# Patient Record
Sex: MALE | Race: WHITE | NOT HISPANIC OR LATINO | Employment: OTHER | URBAN - METROPOLITAN AREA
[De-identification: names, ages, dates, MRNs, and addresses within clinical notes are randomized per-mention and may not be internally consistent; named-entity substitution may affect disease eponyms.]

---

## 2023-03-28 ENCOUNTER — HOSPITAL ENCOUNTER (EMERGENCY)
Facility: HOSPITAL | Age: 73
Discharge: HOME | End: 2023-03-28
Payer: COMMERCIAL

## 2023-03-28 VITALS
OXYGEN SATURATION: 95 % | WEIGHT: 140 LBS | TEMPERATURE: 98 F | SYSTOLIC BLOOD PRESSURE: 170 MMHG | HEIGHT: 65 IN | HEART RATE: 75 BPM | BODY MASS INDEX: 23.32 KG/M2 | DIASTOLIC BLOOD PRESSURE: 92 MMHG | RESPIRATION RATE: 20 BRPM

## 2023-03-28 DIAGNOSIS — M70.22 OLECRANON BURSITIS OF LEFT ELBOW: Primary | ICD-10-CM

## 2023-03-28 DIAGNOSIS — M25.522 PAIN AND SWELLING OF ELBOW, LEFT: ICD-10-CM

## 2023-03-28 DIAGNOSIS — M25.422 PAIN AND SWELLING OF ELBOW, LEFT: ICD-10-CM

## 2023-03-28 LAB
HIV 1+2 AB+HIV1 P24 AG SERPL QL IA: NORMAL
POCT GLUCOSE: 82 MG/DL (ref 70–110)

## 2023-03-28 PROCEDURE — 73080 X-RAY EXAM OF ELBOW: CPT | Mod: 26,LT,, | Performed by: RADIOLOGY

## 2023-03-28 PROCEDURE — 73080 X-RAY EXAM OF ELBOW: CPT | Mod: TC,LT

## 2023-03-28 PROCEDURE — 73080 XR ELBOW COMPLETE 3 VIEW LEFT: ICD-10-PCS | Mod: 26,LT,, | Performed by: RADIOLOGY

## 2023-03-28 PROCEDURE — 82962 GLUCOSE BLOOD TEST: CPT

## 2023-03-28 PROCEDURE — 96372 THER/PROPH/DIAG INJ SC/IM: CPT | Performed by: NURSE PRACTITIONER

## 2023-03-28 PROCEDURE — 99284 EMERGENCY DEPT VISIT MOD MDM: CPT

## 2023-03-28 PROCEDURE — 86803 HEPATITIS C AB TEST: CPT | Performed by: EMERGENCY MEDICINE

## 2023-03-28 PROCEDURE — 63600175 PHARM REV CODE 636 W HCPCS: Performed by: NURSE PRACTITIONER

## 2023-03-28 PROCEDURE — 87389 HIV-1 AG W/HIV-1&-2 AB AG IA: CPT | Performed by: EMERGENCY MEDICINE

## 2023-03-28 RX ORDER — DEXAMETHASONE SODIUM PHOSPHATE 10 MG/ML
6 INJECTION INTRAMUSCULAR; INTRAVENOUS
Status: COMPLETED | OUTPATIENT
Start: 2023-03-28 | End: 2023-03-28

## 2023-03-28 RX ADMIN — DEXAMETHASONE SODIUM PHOSPHATE 6 MG: 10 INJECTION INTRAMUSCULAR; INTRAVENOUS at 02:03

## 2023-03-28 NOTE — DISCHARGE INSTRUCTIONS
Use ibuprofen for the next 2-3 days to aid with reduction in inflammation.    Wear Ace wrap to help with reduction of swelling.    You may also ice the area to help with the reduction of swelling.    Read handout on a cell around bursitis exercises.    Follow-up with your primary care provider if no improvement in 3-5 days of treatment plan.    Return emergency room if symptoms worsen, you develop any new or other worrisome symptom.

## 2023-03-28 NOTE — ED PROVIDER NOTES
Encounter Date: 3/28/2023       History     Chief Complaint   Patient presents with    Joint Swelling     L elbow swelling x 2 weeks     Patient is 73-year-old male presents emergency room with swelling to the left elbow for proximally 2 weeks.  Patient denies any pain, denies any traumatic event.  Patient states he just started swelling has progressively gotten larger.  Patient states he is not increased in size over the past several days.  Patient's past medical history and medication has been reviewed.    Review of patient's allergies indicates:  No Known Allergies  Past Medical History:   Diagnosis Date    Anxiety disorder, unspecified     Diabetes mellitus     High cholesterol     High cholesterol     Kidney stones      Past Surgical History:   Procedure Laterality Date    LITHOTRIPSY       History reviewed. No pertinent family history.  Social History     Tobacco Use    Smoking status: Former     Types: Cigarettes, Vaping with nicotine    Smokeless tobacco: Never     Review of Systems   Constitutional: Negative.    HENT: Negative.     Eyes: Negative.    Respiratory: Negative.     Cardiovascular: Negative.    Gastrointestinal: Negative.    Endocrine: Negative.    Genitourinary: Negative.    Musculoskeletal:  Positive for joint swelling (left elbow).   Skin: Negative.    Allergic/Immunologic: Negative for food allergies.   Neurological: Negative.    Hematological: Negative.    Psychiatric/Behavioral: Negative.     All other systems reviewed and are negative.    Physical Exam     Initial Vitals   BP Pulse Resp Temp SpO2   03/28/23 1350 03/28/23 1347 03/28/23 1347 03/28/23 1347 03/28/23 1347   (!) 170/92 75 20 98 °F (36.7 °C) 95 %      MAP       --                Physical Exam    Nursing note and vitals reviewed.  Constitutional: He appears well-developed and well-nourished. He is not diaphoretic. No distress.   HENT:   Head: Normocephalic and atraumatic.   Mouth/Throat: Oropharynx is clear and moist.   Eyes:  Conjunctivae and EOM are normal. Pupils are equal, round, and reactive to light. No scleral icterus.   Neck:   Normal range of motion.  Cardiovascular:  Normal rate, regular rhythm, normal heart sounds and intact distal pulses.           No murmur heard.  Musculoskeletal:         General: Edema (mobile effusion noted to the left elbow) present. Normal range of motion.      Cervical back: Normal range of motion.     Neurological: He is alert and oriented to person, place, and time. He has normal strength. No sensory deficit. GCS score is 15. GCS eye subscore is 4. GCS verbal subscore is 5. GCS motor subscore is 6.   Skin: Skin is warm and dry. Capillary refill takes 2 to 3 seconds.   Psychiatric: He has a normal mood and affect.       ED Course   Procedures  Labs Reviewed   HIV 1 / 2 ANTIBODY   HEPATITIS C ANTIBODY   POCT GLUCOSE          Imaging Results              X-Ray Elbow Complete Left (In process)                   X-Rays:   Independently Interpreted Readings:   Other Readings:  X-ray left elbow    No fracture, dislocation, or other bony abnormality identified.  Patient does have large amount of swelling base.  Most likely bursitis.  Medications   dexAMETHasone sodium phos (PF) injection 6 mg (has no administration in time range)     Medical Decision Making:   Initial Assessment:   Patient seen examined emergency room, no acute distress noted at this time.  Detailed assessment as noted above.  Glucose noted to be 82 on triage.  Blood pressure noted be 170/92, repeat blood pressure after exam was 166/88.  Patient is not on any blood pressure medicines at this time.  Patient does have a history of anxiety.  Differential Diagnosis:   Fracture, dislocation, effusion, bursitis  Clinical Tests:   Radiological Study: Ordered and Reviewed  ED Management:  Patient seen examined emergency room, x-ray left elbow was ordered.      Left x-ray was reviewed, no acute abnormalities identify the than possible bursitis.  Will  give the patient 6 mg IM Decadron, advised patient take 600-800 mg ibuprofen 3 times a day for the next 3-4 days, use Ace wrap to help reduce the swelling.  Advised patient to use ice to the area which may help reduce the swelling as well.  Patient verbalized understanding treatment plan.                        Clinical Impression:   Final diagnoses:  [M25.522, M25.422] Pain and swelling of elbow, left  [M70.22] Olecranon bursitis of left elbow (Primary)        ED Disposition Condition    Discharge Stable          ED Prescriptions    None       Follow-up Information    None          Tone Mistry NP  03/28/23 1424

## 2023-03-29 LAB — HCV AB SERPL QL IA: NORMAL

## 2024-12-13 DIAGNOSIS — J18.9 PNEUMONIA DUE TO INFECTIOUS ORGANISM, UNSPECIFIED LATERALITY, UNSPECIFIED PART OF LUNG: Primary | ICD-10-CM

## 2024-12-14 ENCOUNTER — HOSPITAL ENCOUNTER (OUTPATIENT)
Dept: RADIOLOGY | Facility: HOSPITAL | Age: 74
Discharge: HOME | End: 2024-12-14
Attending: NURSE PRACTITIONER
Payer: COMMERCIAL

## 2024-12-14 DIAGNOSIS — J18.9 PNEUMONIA DUE TO INFECTIOUS ORGANISM, UNSPECIFIED LATERALITY, UNSPECIFIED PART OF LUNG: ICD-10-CM

## 2024-12-14 PROCEDURE — 71046 X-RAY EXAM CHEST 2 VIEWS: CPT | Mod: TC

## 2024-12-14 PROCEDURE — 71046 X-RAY EXAM CHEST 2 VIEWS: CPT | Mod: 26,,, | Performed by: RADIOLOGY

## 2025-01-13 ENCOUNTER — HOSPITAL ENCOUNTER (EMERGENCY)
Facility: HOSPITAL | Age: 75
Discharge: HOME | End: 2025-01-13
Attending: EMERGENCY MEDICINE
Payer: COMMERCIAL

## 2025-01-13 VITALS
SYSTOLIC BLOOD PRESSURE: 157 MMHG | OXYGEN SATURATION: 95 % | WEIGHT: 140 LBS | DIASTOLIC BLOOD PRESSURE: 78 MMHG | TEMPERATURE: 98 F | BODY MASS INDEX: 23.32 KG/M2 | RESPIRATION RATE: 20 BRPM | HEIGHT: 65 IN | HEART RATE: 98 BPM

## 2025-01-13 DIAGNOSIS — F41.9 ANXIETY: Primary | ICD-10-CM

## 2025-01-13 PROCEDURE — 99283 EMERGENCY DEPT VISIT LOW MDM: CPT

## 2025-01-13 RX ORDER — CLONAZEPAM 0.5 MG/1
0.5 TABLET ORAL 2 TIMES DAILY PRN
Qty: 5 TABLET | Refills: 0 | Status: SHIPPED | OUTPATIENT
Start: 2025-01-13 | End: 2025-01-16

## 2025-01-14 NOTE — DISCHARGE INSTRUCTIONS
Continue your previously prescribed medications and treatments.  Take Klonopin as needed for anxiety.  Do not drive or drink alcohol or combine with any other sedating medications.  Follow-up with your primary care provider tomorrow.  Return here as needed or if worse in any way.

## 2025-01-14 NOTE — ED PROVIDER NOTES
Encounter Date: 1/13/2025       History     Chief Complaint   Patient presents with    Anxiety     PT stated he got into an argument with his wife tonight and started to get really anxious and his home medication just wasn't enough tonight.      75-year-old male here from home via private vehicle complaining of anxiety symptoms.  He states that he got into an argument with his wife earlier this evening and thinks he needs something to help him sleep tonight.  States he has a history of anxiety.  Denies any suicidal or homicidal ideations.      Review of patient's allergies indicates:  No Known Allergies  Past Medical History:   Diagnosis Date    Anxiety disorder, unspecified     Diabetes mellitus     High cholesterol     High cholesterol     Kidney stones      Past Surgical History:   Procedure Laterality Date    LITHOTRIPSY       No family history on file.  Social History     Tobacco Use    Smoking status: Former     Types: Cigarettes, Vaping with nicotine    Smokeless tobacco: Never     Review of Systems   Psychiatric/Behavioral:  Negative for self-injury and suicidal ideas. The patient is nervous/anxious.    All other systems reviewed and are negative.      Physical Exam     Initial Vitals [01/13/25 2224]   BP Pulse Resp Temp SpO2   (!) 157/78 98 20 98 °F (36.7 °C) 95 %      MAP       --         Physical Exam    Nursing note and vitals reviewed.  Constitutional: He appears well-developed and well-nourished. He is not diaphoretic. No distress.   HENT:   Head: Normocephalic and atraumatic.   Nose: Nose normal. Mouth/Throat: Oropharynx is clear and moist. No oropharyngeal exudate.   Eyes: Conjunctivae and EOM are normal. Pupils are equal, round, and reactive to light. No scleral icterus.   Neck: Neck supple. No JVD present.   Normal range of motion.  Cardiovascular:  Normal rate, regular rhythm, normal heart sounds and intact distal pulses.           No murmur heard.  Pulmonary/Chest: Breath sounds normal. No  stridor. No respiratory distress. He has no wheezes. He has no rhonchi. He has no rales.   Abdominal: Abdomen is soft. Bowel sounds are normal. He exhibits no distension. There is no abdominal tenderness.   Musculoskeletal:         General: No tenderness or edema. Normal range of motion.      Cervical back: Normal range of motion and neck supple.     Neurological: He is alert and oriented to person, place, and time. He has normal strength. No cranial nerve deficit or sensory deficit. GCS score is 15. GCS eye subscore is 4. GCS verbal subscore is 5. GCS motor subscore is 6.   Skin: Skin is warm and dry. Capillary refill takes less than 2 seconds. No rash noted. No erythema.   Psychiatric: He has a normal mood and affect. His behavior is normal.   No obvious signs of anxiety, normal heart rate, normal respiratory rate, answers questions appropriately.  Denies suicidal or homicidal thoughts         ED Course   Procedures  Labs Reviewed - No data to display       Imaging Results    None          Medications - No data to display  Medical Decision Making  Differential includes anxiety, depression, suicidal ideation homicidal thoughts, etc.    Normal exam, patient acting appropriately, denies SI or HI.  States he wants something to help him sleep tonight.  I explained that I can not dispense medications for him to take later, and I can not give him anything now because he is driving home.  Will prescribe 5 Klonopin tablets, and he can take the prescription to an all-night pharmacy if he wishes.  He will follow-up with his PCP tomorrow, return here as needed or if worse in any way.    Risk  Prescription drug management.                                      Clinical Impression:  Final diagnoses:  [F41.9] Anxiety (Primary)          ED Disposition Condition    Discharge Stable          ED Prescriptions       Medication Sig Dispense Start Date End Date Auth. Provider    clonazePAM (KLONOPIN) 0.5 MG tablet Take 1 tablet (0.5 mg  total) by mouth 2 (two) times daily as needed for Anxiety. 5 tablet 1/13/2025 1/16/2025 Dougie Moctezuma MD          Follow-up Information       Follow up With Specialties Details Why Contact Info    your primary care doctor  Call in 1 day      [facility] Emergency Dept Emergency Medicine  As needed, If symptoms worsen [address] [id number]  [phone]             Dougie Moctezuma MD  01/13/25 9480